# Patient Record
Sex: FEMALE | URBAN - METROPOLITAN AREA
[De-identification: names, ages, dates, MRNs, and addresses within clinical notes are randomized per-mention and may not be internally consistent; named-entity substitution may affect disease eponyms.]

---

## 2018-03-08 ENCOUNTER — APPOINTMENT (RX ONLY)
Dept: URBAN - METROPOLITAN AREA CLINIC 96 | Facility: CLINIC | Age: 78
Setting detail: DERMATOLOGY
End: 2018-03-08

## 2018-03-08 DIAGNOSIS — L30.8 OTHER SPECIFIED DERMATITIS: ICD-10-CM

## 2018-03-08 DIAGNOSIS — L82.0 INFLAMED SEBORRHEIC KERATOSIS: ICD-10-CM

## 2018-03-08 DIAGNOSIS — L20.89 OTHER ATOPIC DERMATITIS: ICD-10-CM

## 2018-03-08 DIAGNOSIS — L82.1 OTHER SEBORRHEIC KERATOSIS: ICD-10-CM

## 2018-03-08 PROBLEM — L20.84 INTRINSIC (ALLERGIC) ECZEMA: Status: ACTIVE | Noted: 2018-03-08

## 2018-03-08 PROBLEM — J30.1 ALLERGIC RHINITIS DUE TO POLLEN: Status: ACTIVE | Noted: 2018-03-08

## 2018-03-08 PROCEDURE — ? COUNSELING

## 2018-03-08 PROCEDURE — ? TREATMENT REGIMEN

## 2018-03-08 PROCEDURE — ? OBSERVATION AND MEASURE

## 2018-03-08 PROCEDURE — ? PRESCRIPTION

## 2018-03-08 PROCEDURE — 99213 OFFICE O/P EST LOW 20 MIN: CPT

## 2018-03-08 RX ORDER — TRIAMCINOLONE ACETONIDE 1 MG/G
CREAM TOPICAL
Qty: 1 | Refills: 1 | Status: ERX | COMMUNITY
Start: 2018-03-08

## 2018-03-08 RX ORDER — METHYLPREDNISOLONE 4 MG/1
TABLET ORAL
Qty: 1 | Refills: 0 | Status: ERX | COMMUNITY
Start: 2018-03-08

## 2018-03-08 RX ADMIN — METHYLPREDNISOLONE: 4 TABLET ORAL at 00:00

## 2018-03-08 RX ADMIN — TRIAMCINOLONE ACETONIDE 1: 1 CREAM TOPICAL at 00:00

## 2018-03-08 ASSESSMENT — LOCATION DETAILED DESCRIPTION DERM
LOCATION DETAILED: LEFT MEDIAL DORSAL FOOT
LOCATION DETAILED: LEFT PROXIMAL PALMAR RING FINGER
LOCATION DETAILED: LEFT LATERAL ABDOMEN
LOCATION DETAILED: LEFT PROXIMAL DORSAL RING FINGER
LOCATION DETAILED: LEFT ULNAR PALM
LOCATION DETAILED: RIGHT NASAL DORSUM

## 2018-03-08 ASSESSMENT — LOCATION SIMPLE DESCRIPTION DERM
LOCATION SIMPLE: LEFT RING FINGER
LOCATION SIMPLE: NOSE
LOCATION SIMPLE: LEFT FOOT
LOCATION SIMPLE: ABDOMEN
LOCATION SIMPLE: LEFT HAND

## 2018-03-08 ASSESSMENT — LOCATION ZONE DERM
LOCATION ZONE: HAND
LOCATION ZONE: NOSE
LOCATION ZONE: TRUNK
LOCATION ZONE: FEET
LOCATION ZONE: FINGER

## 2018-03-08 NOTE — HPI: SKIN LESION
Is This A New Presentation, Or A Follow-Up?: Skin Lesions
How Severe Is Your Skin Lesion?: mild
Has Your Skin Lesion Been Treated?: not been treated
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

## 2018-03-29 ENCOUNTER — APPOINTMENT (RX ONLY)
Dept: URBAN - METROPOLITAN AREA CLINIC 96 | Facility: CLINIC | Age: 78
Setting detail: DERMATOLOGY
End: 2018-03-29

## 2018-03-29 DIAGNOSIS — B42.1 LYMPHOCUTANEOUS SPOROTRICHOSIS: ICD-10-CM

## 2018-03-29 DIAGNOSIS — B35.3 TINEA PEDIS: ICD-10-CM

## 2018-03-29 DIAGNOSIS — B35.1 TINEA UNGUIUM: ICD-10-CM

## 2018-03-29 PROCEDURE — ? TREATMENT REGIMEN

## 2018-03-29 PROCEDURE — ? ADDITIONAL NOTES

## 2018-03-29 PROCEDURE — ? KOH PREP

## 2018-03-29 PROCEDURE — 99213 OFFICE O/P EST LOW 20 MIN: CPT

## 2018-03-29 PROCEDURE — ? COUNSELING

## 2018-03-29 PROCEDURE — ? PRESCRIPTION

## 2018-03-29 RX ORDER — TERBINAFINE HYDROCHLORIDE 250 MG/1
TABLET ORAL QD
Qty: 30 | Refills: 0 | Status: ERX | COMMUNITY
Start: 2018-03-29

## 2018-03-29 RX ADMIN — TERBINAFINE HYDROCHLORIDE 1: 250 TABLET ORAL at 00:00

## 2018-03-29 ASSESSMENT — LOCATION ZONE DERM
LOCATION ZONE: TOENAIL
LOCATION ZONE: FEET
LOCATION ZONE: FINGERNAIL
LOCATION ZONE: HAND
LOCATION ZONE: FINGER

## 2018-03-29 ASSESSMENT — LOCATION SIMPLE DESCRIPTION DERM
LOCATION SIMPLE: LEFT MIDDLE FINGER
LOCATION SIMPLE: LEFT HAND
LOCATION SIMPLE: LEFT GREAT TOE
LOCATION SIMPLE: LEFT 2ND TOE
LOCATION SIMPLE: LEFT RING FINGER
LOCATION SIMPLE: LEFT RING FINGERNAIL
LOCATION SIMPLE: LEFT FOOT

## 2018-03-29 ASSESSMENT — LOCATION DETAILED DESCRIPTION DERM
LOCATION DETAILED: LEFT PROXIMAL DORSAL RING FINGER
LOCATION DETAILED: LEFT DORSAL FOOT
LOCATION DETAILED: LEFT GREAT TOENAIL
LOCATION DETAILED: LEFT 2ND TOENAIL
LOCATION DETAILED: LEFT RADIAL DORSAL HAND
LOCATION DETAILED: LEFT PROXIMAL DORSAL MIDDLE FINGER
LOCATION DETAILED: LEFT RING FINGERNAIL

## 2018-03-29 NOTE — PROCEDURE: KOH PREP
Koh Intro Text (From The.....): A KOH prep was ordered and evaluated from the
Showing: branching hyphae
Detail Level: Detailed
Koh Procedure Text (Tissue Harvesting Technique): A 15-blade scalpel was used to scrape the skin. The skin scrapings were placed on a glass slide, covered with a coverslip and a KOH solution was applied.

## 2018-03-29 NOTE — PROCEDURE: ADDITIONAL NOTES
Additional Notes: Patient states that she forgot to mention at last OV that this all started after a deep thorn from a odette bush stuck her left finger. The rash originated from that site and started to spread.

## 2018-03-29 NOTE — PROCEDURE: TREATMENT REGIMEN
Samples Given: Genadur topical lacquer apply daily to nail \\n luzu cream on hand and left foot
Detail Level: Zone

## 2018-04-17 ENCOUNTER — APPOINTMENT (RX ONLY)
Dept: URBAN - METROPOLITAN AREA CLINIC 96 | Facility: CLINIC | Age: 78
Setting detail: DERMATOLOGY
End: 2018-04-17

## 2018-04-17 DIAGNOSIS — L82.1 OTHER SEBORRHEIC KERATOSIS: ICD-10-CM

## 2018-04-17 DIAGNOSIS — B35.3 TINEA PEDIS: ICD-10-CM

## 2018-04-17 DIAGNOSIS — L60.3 NAIL DYSTROPHY: ICD-10-CM

## 2018-04-17 DIAGNOSIS — B35.1 TINEA UNGUIUM: ICD-10-CM

## 2018-04-17 DIAGNOSIS — B35.2 TINEA MANUUM: ICD-10-CM

## 2018-04-17 PROCEDURE — 99213 OFFICE O/P EST LOW 20 MIN: CPT

## 2018-04-17 PROCEDURE — ? PRESCRIPTION

## 2018-04-17 PROCEDURE — ? ADDITIONAL NOTES

## 2018-04-17 PROCEDURE — ? COUNSELING

## 2018-04-17 PROCEDURE — ? TREATMENT REGIMEN

## 2018-04-17 RX ORDER — MUPIROCIN 20 MG/G
TID OINTMENT TOPICAL
Qty: 1 | Refills: 1 | Status: ERX | COMMUNITY
Start: 2018-04-17

## 2018-04-17 RX ADMIN — MUPIROCIN TID: 20 OINTMENT TOPICAL at 00:00

## 2018-04-17 ASSESSMENT — LOCATION SIMPLE DESCRIPTION DERM
LOCATION SIMPLE: RIGHT CHEEK
LOCATION SIMPLE: LEFT HAND
LOCATION SIMPLE: LEFT 2ND TOE
LOCATION SIMPLE: LEFT GREAT TOE
LOCATION SIMPLE: LEFT RING FINGERNAIL

## 2018-04-17 ASSESSMENT — LOCATION ZONE DERM
LOCATION ZONE: FINGERNAIL
LOCATION ZONE: HAND
LOCATION ZONE: TOENAIL
LOCATION ZONE: FACE
LOCATION ZONE: TOE

## 2018-04-17 ASSESSMENT — LOCATION DETAILED DESCRIPTION DERM
LOCATION DETAILED: 4TH WEB SPACE LEFT HAND
LOCATION DETAILED: LEFT 2ND TOENAIL
LOCATION DETAILED: LEFT RING FINGERNAIL
LOCATION DETAILED: LEFT GREAT TOENAIL
LOCATION DETAILED: 2ND WEB SPACE LEFT HAND
LOCATION DETAILED: LEFT DORSAL GREAT TOE
LOCATION DETAILED: RIGHT SUPERIOR NASAL CHEEK
LOCATION DETAILED: LEFT ULNAR PALM

## 2018-04-17 NOTE — PROCEDURE: ADDITIONAL NOTES
Additional Notes: Nail dystrophy - fingernail trauma secondary to thorn bush/ foreign body reaction.  Discussed slowly trying to trim the nail back as it grows.  Can continue with genadur daily.
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  I don't see any sporotrichoid spread on todays visit.  Will recheck at follow up visit after completing 4 weeks of oral lamisil.  If not continuing to improve will consider course of treatment with itraconazole.

## 2019-02-26 ENCOUNTER — APPOINTMENT (RX ONLY)
Dept: URBAN - METROPOLITAN AREA CLINIC 96 | Facility: CLINIC | Age: 79
Setting detail: DERMATOLOGY
End: 2019-02-26

## 2019-02-26 DIAGNOSIS — M71 OTHER BURSOPATHIES: ICD-10-CM

## 2019-02-26 DIAGNOSIS — L82.0 INFLAMED SEBORRHEIC KERATOSIS: ICD-10-CM

## 2019-02-26 PROBLEM — M71.341 OTHER BURSAL CYST, RIGHT HAND: Status: ACTIVE | Noted: 2019-02-26

## 2019-02-26 PROCEDURE — ? LIQUID NITROGEN

## 2019-02-26 PROCEDURE — 17110 DESTRUCTION B9 LES UP TO 14: CPT

## 2019-02-26 PROCEDURE — 99213 OFFICE O/P EST LOW 20 MIN: CPT | Mod: 25

## 2019-02-26 PROCEDURE — ? COUNSELING

## 2019-02-26 PROCEDURE — ? ADDITIONAL NOTES

## 2019-02-26 ASSESSMENT — LOCATION ZONE DERM
LOCATION ZONE: TRUNK
LOCATION ZONE: FINGER

## 2019-02-26 ASSESSMENT — LOCATION DETAILED DESCRIPTION DERM
LOCATION DETAILED: LEFT LATERAL ABDOMEN
LOCATION DETAILED: RIGHT MID DORSAL INDEX FINGER

## 2019-02-26 ASSESSMENT — LOCATION SIMPLE DESCRIPTION DERM
LOCATION SIMPLE: ABDOMEN
LOCATION SIMPLE: RIGHT INDEX FINGER

## 2019-02-26 NOTE — PROCEDURE: ADDITIONAL NOTES
Additional Notes: Recommended to see a hand surgeon, recommended Dr. Alex Hunter information given\\nDisc I & D today but patient declined
Detail Level: Simple

## 2019-02-26 NOTE — PROCEDURE: LIQUID NITROGEN
Add 52 Modifier (Optional): no
Number Of Freeze-Thaw Cycles: 2 freeze-thaw cycles
Medical Necessity Clause: This procedure was medically necessary because the lesions that were treated were:
Medical Necessity Information: It is in your best interest to select a reason for this procedure from the list below. All of these items fulfill various CMS LCD requirements except the new and changing color options.
Consent: The patient's consent was obtained including but not limited to risks of crusting, scabbing, blistering, scarring, darker or lighter pigmentary change, recurrence, incomplete removal and infection.
Detail Level: Detailed
Post-Care Instructions: I reviewed with the patient in detail post-care instructions. Patient is to wear sunprotection, and avoid picking at any of the treated lesions. Pt may apply Vaseline to crusted or scabbing areas.

## 2019-08-13 ENCOUNTER — APPOINTMENT (RX ONLY)
Dept: URBAN - METROPOLITAN AREA CLINIC 96 | Facility: CLINIC | Age: 79
Setting detail: DERMATOLOGY
End: 2019-08-13

## 2019-08-13 DIAGNOSIS — D22 MELANOCYTIC NEVI: ICD-10-CM

## 2019-08-13 DIAGNOSIS — L81.4 OTHER MELANIN HYPERPIGMENTATION: ICD-10-CM

## 2019-08-13 DIAGNOSIS — L82.0 INFLAMED SEBORRHEIC KERATOSIS: ICD-10-CM

## 2019-08-13 DIAGNOSIS — L57.0 ACTINIC KERATOSIS: ICD-10-CM

## 2019-08-13 PROBLEM — D22.5 MELANOCYTIC NEVI OF TRUNK: Status: ACTIVE | Noted: 2019-08-13

## 2019-08-13 PROCEDURE — ? COUNSELING

## 2019-08-13 PROCEDURE — 99213 OFFICE O/P EST LOW 20 MIN: CPT | Mod: 25

## 2019-08-13 PROCEDURE — 17000 DESTRUCT PREMALG LESION: CPT | Mod: 59

## 2019-08-13 PROCEDURE — 17110 DESTRUCTION B9 LES UP TO 14: CPT

## 2019-08-13 PROCEDURE — 17003 DESTRUCT PREMALG LES 2-14: CPT | Mod: 59

## 2019-08-13 PROCEDURE — ? LIQUID NITROGEN

## 2019-08-13 ASSESSMENT — LOCATION ZONE DERM
LOCATION ZONE: NOSE
LOCATION ZONE: TRUNK
LOCATION ZONE: FACE

## 2019-08-13 ASSESSMENT — LOCATION DETAILED DESCRIPTION DERM
LOCATION DETAILED: RIGHT SUPERIOR NASAL CHEEK
LOCATION DETAILED: LEFT SUPERIOR MEDIAL UPPER BACK
LOCATION DETAILED: NASAL SUPRATIP
LOCATION DETAILED: LEFT MID-UPPER BACK
LOCATION DETAILED: STERNAL NOTCH

## 2019-08-13 ASSESSMENT — LOCATION SIMPLE DESCRIPTION DERM
LOCATION SIMPLE: CHEST
LOCATION SIMPLE: LEFT UPPER BACK
LOCATION SIMPLE: NOSE
LOCATION SIMPLE: RIGHT CHEEK

## 2019-08-13 NOTE — PROCEDURE: MIPS QUALITY
Quality 110: Preventive Care And Screening: Influenza Immunization: Influenza Immunization previously received during influenza season
Detail Level: Detailed
Quality 111:Pneumonia Vaccination Status For Older Adults: Pneumococcal Vaccination Previously Received
Quality 226: Preventive Care And Screening: Tobacco Use: Screening And Cessation Intervention: Patient screened for tobacco use and is an ex/non-smoker
Quality 130: Documentation Of Current Medications In The Medical Record: Current Medications Documented
Quality 131: Pain Assessment And Follow-Up: Pain assessment using a standardized tool is documented as negative, no follow-up plan required

## 2019-08-13 NOTE — PROCEDURE: LIQUID NITROGEN
Duration Of Freeze Thaw-Cycle (Seconds): 0
Render Post-Care Instructions In Note?: no
Detail Level: Detailed
Consent: The patient's consent was obtained including but not limited to risks of crusting, scabbing, blistering, scarring, darker or lighter pigmentary change, recurrence, incomplete removal and infection.
Post-Care Instructions: I reviewed with the patient in detail post-care instructions. Patient is to wear sunprotection, and avoid picking at any of the treated lesions. Pt may apply Vaseline to crusted or scabbing areas.
Number Of Freeze-Thaw Cycles: 2 freeze-thaw cycles
Medical Necessity Information: It is in your best interest to select a reason for this procedure from the list below. All of these items fulfill various CMS LCD requirements except the new and changing color options.
Medical Necessity Clause: This procedure was medically necessary because the lesions that were treated were:

## 2021-03-30 ENCOUNTER — APPOINTMENT (RX ONLY)
Dept: URBAN - METROPOLITAN AREA CLINIC 96 | Facility: CLINIC | Age: 81
Setting detail: DERMATOLOGY
End: 2021-03-30

## 2021-03-30 DIAGNOSIS — L82.0 INFLAMED SEBORRHEIC KERATOSIS: ICD-10-CM

## 2021-03-30 DIAGNOSIS — L82.1 OTHER SEBORRHEIC KERATOSIS: ICD-10-CM

## 2021-03-30 PROCEDURE — 99212 OFFICE O/P EST SF 10 MIN: CPT | Mod: 25

## 2021-03-30 PROCEDURE — ? DEFER

## 2021-03-30 PROCEDURE — 17110 DESTRUCTION B9 LES UP TO 14: CPT

## 2021-03-30 PROCEDURE — ? LIQUID NITROGEN

## 2021-03-30 PROCEDURE — ? COUNSELING

## 2021-03-30 ASSESSMENT — LOCATION ZONE DERM: LOCATION ZONE: TRUNK

## 2021-03-30 ASSESSMENT — LOCATION DETAILED DESCRIPTION DERM
LOCATION DETAILED: LEFT PERIAREOLAR BREAST 1-2:00 REGION
LOCATION DETAILED: LEFT LATERAL BREAST 4-5:00 REGION

## 2021-03-30 ASSESSMENT — LOCATION SIMPLE DESCRIPTION DERM: LOCATION SIMPLE: LEFT BREAST

## 2021-03-30 NOTE — PROCEDURE: LIQUID NITROGEN
Include Z78.9 (Other Specified Conditions Influencing Health Status) As An Associated Diagnosis?: No
Number Of Freeze-Thaw Cycles: 2 freeze-thaw cycles
Medical Necessity Clause: This procedure was medically necessary because the lesions that were treated were:
Post-Care Instructions: I reviewed with the patient in detail post-care instructions. Patient is to wear sunprotection, and avoid picking at any of the treated lesions. Pt may apply Vaseline to crusted or scabbing areas.
Medical Necessity Information: It is in your best interest to select a reason for this procedure from the list below. All of these items fulfill various CMS LCD requirements except the new and changing color options.
Detail Level: Detailed
Consent: The patient's consent was obtained including but not limited to risks of crusting, scabbing, blistering, scarring, darker or lighter pigmentary change, recurrence, incomplete removal and infection.

## 2021-03-30 NOTE — PROCEDURE: DEFER
Procedure To Be Performed At Next Visit: Cryotherapy
Detail Level: Simple
Introduction Text (Please End With A Colon): The following procedure was deferred:\\nCan treat lesion/s on different date at follow up visit

## 2023-10-12 ENCOUNTER — APPOINTMENT (RX ONLY)
Dept: URBAN - METROPOLITAN AREA CLINIC 96 | Facility: CLINIC | Age: 83
Setting detail: DERMATOLOGY
End: 2023-10-12

## 2023-10-12 DIAGNOSIS — L57.8 OTHER SKIN CHANGES DUE TO CHRONIC EXPOSURE TO NONIONIZING RADIATION: ICD-10-CM

## 2023-10-12 DIAGNOSIS — L57.0 ACTINIC KERATOSIS: ICD-10-CM

## 2023-10-12 PROCEDURE — 99213 OFFICE O/P EST LOW 20 MIN: CPT | Mod: 25

## 2023-10-12 PROCEDURE — ? LIQUID NITROGEN

## 2023-10-12 PROCEDURE — ? SUNSCREEN RECOMMENDATIONS

## 2023-10-12 PROCEDURE — ? COUNSELING

## 2023-10-12 PROCEDURE — 17000 DESTRUCT PREMALG LESION: CPT

## 2023-10-12 ASSESSMENT — LOCATION DETAILED DESCRIPTION DERM
LOCATION DETAILED: RIGHT DISTAL DORSAL FOREARM
LOCATION DETAILED: LEFT DISTAL DORSAL FOREARM
LOCATION DETAILED: LEFT INFERIOR MEDIAL FOREHEAD

## 2023-10-12 ASSESSMENT — LOCATION ZONE DERM
LOCATION ZONE: FACE
LOCATION ZONE: ARM

## 2023-10-12 ASSESSMENT — LOCATION SIMPLE DESCRIPTION DERM
LOCATION SIMPLE: LEFT FOREARM
LOCATION SIMPLE: RIGHT FOREARM
LOCATION SIMPLE: LEFT FOREHEAD

## 2023-10-12 NOTE — PROCEDURE: LIQUID NITROGEN
Duration Of Freeze Thaw-Cycle (Seconds): 3
Consent: The patient's consent was obtained including but not limited to risks of crusting, scabbing, blistering, scarring, darker or lighter pigmentary change, recurrence, incomplete removal and infection.
Show Aperture Variable?: Yes
Application Tool (Optional): Liquid Nitrogen Sprayer
Render Note In Bullet Format When Appropriate: No
Number Of Freeze-Thaw Cycles: 1 freeze-thaw cycle
Post-Care Instructions: I reviewed with the patient in detail post-care instructions. Patient is to wear sunprotection, and avoid picking at any of the treated lesions. Pt may apply Vaseline to crusted or scabbing areas.
Detail Level: Detailed

## 2023-11-01 ENCOUNTER — APPOINTMENT (RX ONLY)
Dept: URBAN - METROPOLITAN AREA CLINIC 96 | Facility: CLINIC | Age: 83
Setting detail: DERMATOLOGY
End: 2023-11-01

## 2023-11-01 DIAGNOSIS — L57.8 OTHER SKIN CHANGES DUE TO CHRONIC EXPOSURE TO NONIONIZING RADIATION: ICD-10-CM

## 2023-11-01 DIAGNOSIS — L81.4 OTHER MELANIN HYPERPIGMENTATION: ICD-10-CM

## 2023-11-01 DIAGNOSIS — L57.0 ACTINIC KERATOSIS: ICD-10-CM

## 2023-11-01 PROBLEM — D48.5 NEOPLASM OF UNCERTAIN BEHAVIOR OF SKIN: Status: ACTIVE | Noted: 2023-11-01

## 2023-11-01 PROCEDURE — 11102 TANGNTL BX SKIN SINGLE LES: CPT

## 2023-11-01 PROCEDURE — ? COUNSELING

## 2023-11-01 PROCEDURE — ? BIOPSY BY SHAVE METHOD

## 2023-11-01 PROCEDURE — ? SUNSCREEN RECOMMENDATIONS

## 2023-11-01 PROCEDURE — 99213 OFFICE O/P EST LOW 20 MIN: CPT | Mod: 25

## 2023-11-01 ASSESSMENT — LOCATION ZONE DERM
LOCATION ZONE: FACE
LOCATION ZONE: ARM

## 2023-11-01 ASSESSMENT — LOCATION SIMPLE DESCRIPTION DERM
LOCATION SIMPLE: LEFT FOREHEAD
LOCATION SIMPLE: RIGHT FOREHEAD
LOCATION SIMPLE: LEFT FOREARM
LOCATION SIMPLE: RIGHT FOREARM

## 2023-11-01 ASSESSMENT — LOCATION DETAILED DESCRIPTION DERM
LOCATION DETAILED: RIGHT PROXIMAL DORSAL FOREARM
LOCATION DETAILED: LEFT INFERIOR MEDIAL FOREHEAD
LOCATION DETAILED: LEFT PROXIMAL DORSAL FOREARM
LOCATION DETAILED: RIGHT MEDIAL FOREHEAD

## 2023-11-01 NOTE — PROCEDURE: BIOPSY BY SHAVE METHOD
Detail Level: Detailed
Depth Of Biopsy: dermis
Was A Bandage Applied: Yes
Size Of Lesion In Cm: 0.3
X Size Of Lesion In Cm: 0
Biopsy Type: H and E
Biopsy Method: Dermablade
Anesthesia Type: 1% lidocaine with epinephrine
Anesthesia Volume In Cc (Will Not Render If 0): 0.5
Hemostasis: Electrocautery
Wound Care: No ointment
Dressing: bandage
Destruction After The Procedure: No
Type Of Destruction Used: Curettage
Curettage Text: The wound bed was treated with curettage after the biopsy was performed.
Cryotherapy Text: The wound bed was treated with cryotherapy after the biopsy was performed.
Electrodesiccation Text: The wound bed was treated with electrodesiccation after the biopsy was performed.
Electrodesiccation And Curettage Text: The wound bed was treated with electrodesiccation and curettage after the biopsy was performed.
Silver Nitrate Text: The wound bed was treated with silver nitrate after the biopsy was performed.
Lab: [id number]
Lab Facility: [id number]
Consent: Written consent was obtained and risks were reviewed including but not limited to scarring, infection, bleeding, scabbing, incomplete removal, nerve damage and allergy to anesthesia.
Post-Care Instructions: I reviewed with the patient in detail post-care instructions. Patient is to keep the biopsy site dry overnight, and then apply bacitracin twice daily until healed. Patient may apply hydrogen peroxide soaks to remove any crusting.
Notification Instructions: Patient will be notified of biopsy results. However, patient instructed to call the office if not contacted within 2 weeks.
Billing Type: Third-Party Bill
Information: Selecting Yes will display possible errors in your note based on the variables you have selected. This validation is only offered as a suggestion for you. PLEASE NOTE THAT THE VALIDATION TEXT WILL BE REMOVED WHEN YOU FINALIZE YOUR NOTE. IF YOU WANT TO FAX A PRELIMINARY NOTE YOU WILL NEED TO TOGGLE THIS TO 'NO' IF YOU DO NOT WANT IT IN YOUR FAXED NOTE.